# Patient Record
Sex: MALE | Race: WHITE | NOT HISPANIC OR LATINO | Employment: FULL TIME | URBAN - METROPOLITAN AREA
[De-identification: names, ages, dates, MRNs, and addresses within clinical notes are randomized per-mention and may not be internally consistent; named-entity substitution may affect disease eponyms.]

---

## 2023-04-06 DIAGNOSIS — B00.9 HERPES: ICD-10-CM

## 2023-04-06 DIAGNOSIS — E03.9 HYPOTHYROIDISM, UNSPECIFIED TYPE: Primary | ICD-10-CM

## 2023-04-06 RX ORDER — LEVOTHYROXINE SODIUM 50 UG/1
50 TABLET ORAL DAILY
Qty: 90 TABLET | Refills: 1 | Status: SHIPPED | OUTPATIENT
Start: 2023-04-06 | End: 2023-10-06 | Stop reason: SDUPTHER

## 2023-04-06 RX ORDER — VALACYCLOVIR HYDROCHLORIDE 500 MG/1
500 TABLET, FILM COATED ORAL 2 TIMES DAILY
COMMUNITY
Start: 2015-11-18 | End: 2023-04-06 | Stop reason: SDUPTHER

## 2023-04-06 RX ORDER — VALACYCLOVIR HYDROCHLORIDE 500 MG/1
500 TABLET, FILM COATED ORAL 2 TIMES DAILY
Qty: 30 TABLET | Refills: 1 | Status: SHIPPED | OUTPATIENT
Start: 2023-04-06 | End: 2023-11-02 | Stop reason: SDUPTHER

## 2023-04-06 RX ORDER — LEVOTHYROXINE SODIUM 50 UG/1
50 TABLET ORAL DAILY
COMMUNITY
End: 2023-04-06 | Stop reason: SDUPTHER

## 2023-10-04 ENCOUNTER — TELEPHONE (OUTPATIENT)
Dept: PRIMARY CARE | Facility: CLINIC | Age: 53
End: 2023-10-04
Payer: COMMERCIAL

## 2023-10-04 DIAGNOSIS — E03.9 HYPOTHYROIDISM, UNSPECIFIED TYPE: ICD-10-CM

## 2023-10-06 RX ORDER — LEVOTHYROXINE SODIUM 50 UG/1
50 TABLET ORAL DAILY
Qty: 90 TABLET | Refills: 1 | Status: SHIPPED | OUTPATIENT
Start: 2023-10-06 | End: 2023-11-02 | Stop reason: SDUPTHER

## 2023-11-02 ENCOUNTER — OFFICE VISIT (OUTPATIENT)
Dept: PRIMARY CARE | Facility: CLINIC | Age: 53
End: 2023-11-02
Payer: COMMERCIAL

## 2023-11-02 VITALS
SYSTOLIC BLOOD PRESSURE: 107 MMHG | WEIGHT: 178.13 LBS | HEIGHT: 70 IN | OXYGEN SATURATION: 95 % | TEMPERATURE: 97.7 F | BODY MASS INDEX: 25.5 KG/M2 | HEART RATE: 51 BPM | RESPIRATION RATE: 16 BRPM | DIASTOLIC BLOOD PRESSURE: 70 MMHG

## 2023-11-02 DIAGNOSIS — E03.9 HYPOTHYROIDISM, UNSPECIFIED TYPE: ICD-10-CM

## 2023-11-02 DIAGNOSIS — Z12.5 PROSTATE CANCER SCREENING: Primary | ICD-10-CM

## 2023-11-02 DIAGNOSIS — Z11.4 ENCOUNTER FOR SCREENING FOR HIV: ICD-10-CM

## 2023-11-02 DIAGNOSIS — B00.9 HERPES: ICD-10-CM

## 2023-11-02 DIAGNOSIS — Z11.59 ENCOUNTER FOR HEPATITIS C SCREENING TEST FOR LOW RISK PATIENT: ICD-10-CM

## 2023-11-02 DIAGNOSIS — Z00.00 HEALTHCARE MAINTENANCE: ICD-10-CM

## 2023-11-02 PROBLEM — I49.3 ASYMPTOMATIC PVCS: Status: ACTIVE | Noted: 2023-11-02

## 2023-11-02 PROBLEM — K21.9 GERD (GASTROESOPHAGEAL REFLUX DISEASE): Status: ACTIVE | Noted: 2023-11-02

## 2023-11-02 PROBLEM — K22.70 BARRETT'S ESOPHAGUS WITHOUT DYSPLASIA: Status: ACTIVE | Noted: 2023-11-02

## 2023-11-02 PROBLEM — J45.909 ASTHMA (HHS-HCC): Status: ACTIVE | Noted: 2023-11-02

## 2023-11-02 PROBLEM — J12.82 PNEUMONIA DUE TO COVID-19 VIRUS: Status: ACTIVE | Noted: 2023-11-02

## 2023-11-02 PROBLEM — F41.9 ANXIETY: Status: ACTIVE | Noted: 2023-11-02

## 2023-11-02 PROBLEM — U07.1 PNEUMONIA DUE TO COVID-19 VIRUS: Status: ACTIVE | Noted: 2023-11-02

## 2023-11-02 PROBLEM — M54.12 CERVICAL NEURALGIA: Status: ACTIVE | Noted: 2023-11-02

## 2023-11-02 LAB
ALBUMIN SERPL BCP-MCNC: 4.2 G/DL (ref 3.4–5)
ALP SERPL-CCNC: 46 U/L (ref 33–120)
ALT SERPL W P-5'-P-CCNC: 20 U/L (ref 10–52)
ANION GAP SERPL CALC-SCNC: 9 MMOL/L (ref 10–20)
AST SERPL W P-5'-P-CCNC: 25 U/L (ref 9–39)
BILIRUB SERPL-MCNC: 0.4 MG/DL (ref 0–1.2)
BUN SERPL-MCNC: 14 MG/DL (ref 6–23)
CALCIUM SERPL-MCNC: 9.3 MG/DL (ref 8.6–10.3)
CHLORIDE SERPL-SCNC: 100 MMOL/L (ref 98–107)
CHOLEST SERPL-MCNC: 180 MG/DL (ref 0–199)
CHOLESTEROL/HDL RATIO: 2.9
CO2 SERPL-SCNC: 29 MMOL/L (ref 21–32)
CREAT SERPL-MCNC: 0.87 MG/DL (ref 0.5–1.3)
ERYTHROCYTE [DISTWIDTH] IN BLOOD BY AUTOMATED COUNT: 12.7 % (ref 11.5–14.5)
GFR SERPL CREATININE-BSD FRML MDRD: >90 ML/MIN/1.73M*2
GLUCOSE SERPL-MCNC: 88 MG/DL (ref 74–99)
HCT VFR BLD AUTO: 40.2 % (ref 41–52)
HCV AB SER QL: NONREACTIVE
HDLC SERPL-MCNC: 61.6 MG/DL
HGB BLD-MCNC: 13.8 G/DL (ref 13.5–17.5)
MCH RBC QN AUTO: 31.7 PG (ref 26–34)
MCHC RBC AUTO-ENTMCNC: 34.3 G/DL (ref 32–36)
MCV RBC AUTO: 92 FL (ref 80–100)
NON-HDL CHOLESTEROL: 118 MG/DL (ref 0–149)
NRBC BLD-RTO: 0 /100 WBCS (ref 0–0)
PLATELET # BLD AUTO: 225 X10*3/UL (ref 150–450)
POTASSIUM SERPL-SCNC: 3.9 MMOL/L (ref 3.5–5.3)
PROT SERPL-MCNC: 6.6 G/DL (ref 6.4–8.2)
RBC # BLD AUTO: 4.36 X10*6/UL (ref 4.5–5.9)
SODIUM SERPL-SCNC: 134 MMOL/L (ref 136–145)
TSH SERPL-ACNC: 1.69 MIU/L (ref 0.44–3.98)
WBC # BLD AUTO: 6.2 X10*3/UL (ref 4.4–11.3)

## 2023-11-02 PROCEDURE — 83718 ASSAY OF LIPOPROTEIN: CPT

## 2023-11-02 PROCEDURE — 84443 ASSAY THYROID STIM HORMONE: CPT

## 2023-11-02 PROCEDURE — 87389 HIV-1 AG W/HIV-1&-2 AB AG IA: CPT

## 2023-11-02 PROCEDURE — 86803 HEPATITIS C AB TEST: CPT

## 2023-11-02 PROCEDURE — 99396 PREV VISIT EST AGE 40-64: CPT | Performed by: STUDENT IN AN ORGANIZED HEALTH CARE EDUCATION/TRAINING PROGRAM

## 2023-11-02 PROCEDURE — 80053 COMPREHEN METABOLIC PANEL: CPT

## 2023-11-02 PROCEDURE — 99213 OFFICE O/P EST LOW 20 MIN: CPT | Performed by: STUDENT IN AN ORGANIZED HEALTH CARE EDUCATION/TRAINING PROGRAM

## 2023-11-02 PROCEDURE — 85027 COMPLETE CBC AUTOMATED: CPT

## 2023-11-02 PROCEDURE — 36415 COLL VENOUS BLD VENIPUNCTURE: CPT

## 2023-11-02 PROCEDURE — 1036F TOBACCO NON-USER: CPT | Performed by: STUDENT IN AN ORGANIZED HEALTH CARE EDUCATION/TRAINING PROGRAM

## 2023-11-02 PROCEDURE — 82465 ASSAY BLD/SERUM CHOLESTEROL: CPT

## 2023-11-02 RX ORDER — VALACYCLOVIR HYDROCHLORIDE 500 MG/1
500 TABLET, FILM COATED ORAL 2 TIMES DAILY
Qty: 30 TABLET | Refills: 1 | Status: SHIPPED | OUTPATIENT
Start: 2023-11-02

## 2023-11-02 RX ORDER — LEVOTHYROXINE SODIUM 50 UG/1
50 TABLET ORAL DAILY
Qty: 90 TABLET | Refills: 3 | Status: SHIPPED | OUTPATIENT
Start: 2023-11-02

## 2023-11-02 RX ORDER — LEVOTHYROXINE SODIUM 50 UG/1
50 TABLET ORAL DAILY
Qty: 90 TABLET | Refills: 1 | Status: SHIPPED | OUTPATIENT
Start: 2023-11-02 | End: 2023-11-02 | Stop reason: SDUPTHER

## 2023-11-02 SDOH — ECONOMIC STABILITY: FOOD INSECURITY: WITHIN THE PAST 12 MONTHS, THE FOOD YOU BOUGHT JUST DIDN'T LAST AND YOU DIDN'T HAVE MONEY TO GET MORE.: NEVER TRUE

## 2023-11-02 SDOH — ECONOMIC STABILITY: FOOD INSECURITY: WITHIN THE PAST 12 MONTHS, YOU WORRIED THAT YOUR FOOD WOULD RUN OUT BEFORE YOU GOT MONEY TO BUY MORE.: NEVER TRUE

## 2023-11-02 SDOH — ECONOMIC STABILITY: TRANSPORTATION INSECURITY
IN THE PAST 12 MONTHS, HAS LACK OF TRANSPORTATION KEPT YOU FROM MEETINGS, WORK, OR FROM GETTING THINGS NEEDED FOR DAILY LIVING?: NO

## 2023-11-02 SDOH — ECONOMIC STABILITY: GENERAL
WHICH OF THE FOLLOWING WOULD YOU LIKE TO GET CONNECTED TO IN ORDER TO RECEIVE A DISCOUNT OR FOR FREE? (CHOOSE ALL THAT APPLY): NONE OF THESE

## 2023-11-02 SDOH — HEALTH STABILITY: PHYSICAL HEALTH: ON AVERAGE, HOW MANY MINUTES DO YOU ENGAGE IN EXERCISE AT THIS LEVEL?: 0 MIN

## 2023-11-02 SDOH — ECONOMIC STABILITY: HOUSING INSECURITY
IN THE LAST 12 MONTHS, WAS THERE A TIME WHEN YOU DID NOT HAVE A STEADY PLACE TO SLEEP OR SLEPT IN A SHELTER (INCLUDING NOW)?: NO

## 2023-11-02 SDOH — HEALTH STABILITY: PHYSICAL HEALTH: ON AVERAGE, HOW MANY DAYS PER WEEK DO YOU ENGAGE IN MODERATE TO STRENUOUS EXERCISE (LIKE A BRISK WALK)?: 0 DAYS

## 2023-11-02 SDOH — ECONOMIC STABILITY: INCOME INSECURITY: IN THE LAST 12 MONTHS, WAS THERE A TIME WHEN YOU WERE NOT ABLE TO PAY THE MORTGAGE OR RENT ON TIME?: NO

## 2023-11-02 SDOH — ECONOMIC STABILITY: TRANSPORTATION INSECURITY
IN THE PAST 12 MONTHS, HAS THE LACK OF TRANSPORTATION KEPT YOU FROM MEDICAL APPOINTMENTS OR FROM GETTING MEDICATIONS?: NO

## 2023-11-02 SDOH — ECONOMIC STABILITY: GENERAL
WHICH OF THE FOLLOWING DO YOU KNOW HOW TO USE AND HAVE ACCESS TO EVERY DAY? (CHOOSE ALL THAT APPLY): DESKTOP COMPUTER, LAPTOP COMPUTER, OR TABLET WITH BROADBAND INTERNET CONNECTION;SMARTPHONE WITH CELLULAR DATA PLAN

## 2023-11-02 ASSESSMENT — PATIENT HEALTH QUESTIONNAIRE - PHQ9
1. LITTLE INTEREST OR PLEASURE IN DOING THINGS: NOT AT ALL
2. FEELING DOWN, DEPRESSED OR HOPELESS: NOT AT ALL
SUM OF ALL RESPONSES TO PHQ9 QUESTIONS 1 & 2: 0

## 2023-11-02 ASSESSMENT — ENCOUNTER SYMPTOMS
LOSS OF SENSATION IN FEET: 0
OCCASIONAL FEELINGS OF UNSTEADINESS: 0
DEPRESSION: 0

## 2023-11-02 ASSESSMENT — SOCIAL DETERMINANTS OF HEALTH (SDOH)
IN A TYPICAL WEEK, HOW MANY TIMES DO YOU TALK ON THE PHONE WITH FAMILY, FRIENDS, OR NEIGHBORS?: ONCE A WEEK
HOW HARD IS IT FOR YOU TO PAY FOR THE VERY BASICS LIKE FOOD, HOUSING, MEDICAL CARE, AND HEATING?: NOT HARD AT ALL
HOW OFTEN DO YOU ATTEND CHURCH OR RELIGIOUS SERVICES?: NEVER
WITHIN THE LAST YEAR, HAVE YOU BEEN KICKED, HIT, SLAPPED, OR OTHERWISE PHYSICALLY HURT BY YOUR PARTNER OR EX-PARTNER?: NO
DO YOU BELONG TO ANY CLUBS OR ORGANIZATIONS SUCH AS CHURCH GROUPS UNIONS, FRATERNAL OR ATHLETIC GROUPS, OR SCHOOL GROUPS?: NO
WITHIN THE LAST YEAR, HAVE YOU BEEN HUMILIATED OR EMOTIONALLY ABUSED IN OTHER WAYS BY YOUR PARTNER OR EX-PARTNER?: NO
HOW OFTEN DO YOU GET TOGETHER WITH FRIENDS OR RELATIVES?: ONCE A WEEK
HOW OFTEN DO YOU ATTENT MEETINGS OF THE CLUB OR ORGANIZATION YOU BELONG TO?: NEVER
WITHIN THE LAST YEAR, HAVE TO BEEN RAPED OR FORCED TO HAVE ANY KIND OF SEXUAL ACTIVITY BY YOUR PARTNER OR EX-PARTNER?: NO
WITHIN THE LAST YEAR, HAVE YOU BEEN AFRAID OF YOUR PARTNER OR EX-PARTNER?: NO
IN THE PAST 12 MONTHS, HAS THE ELECTRIC, GAS, OIL, OR WATER COMPANY THREATENED TO SHUT OFF SERVICE IN YOUR HOME?: NO

## 2023-11-02 ASSESSMENT — LIFESTYLE VARIABLES
HOW OFTEN DO YOU HAVE A DRINK CONTAINING ALCOHOL: 2-4 TIMES A MONTH
HOW MANY STANDARD DRINKS CONTAINING ALCOHOL DO YOU HAVE ON A TYPICAL DAY: 3 OR 4

## 2023-11-02 NOTE — PROGRESS NOTES
Subjective   Tano Can is a 53 y.o. male who is here for a routine exam.  Active Problem List      Comprehensive Medical/Surgical/Social/Family History  History reviewed. No pertinent past medical history.  Past Surgical History:   Procedure Laterality Date    HERNIA REPAIR  02/25/2016    Hernia Repair    MOUTH SURGERY  02/25/2016    Oral Surgery Tooth Extraction    OTHER SURGICAL HISTORY  12/13/2021    Colonoscopy    OTHER SURGICAL HISTORY  12/13/2021    Esophagogastroduodenoscopy     Social History     Social History Narrative    Not on file         Allergies and Medications  Patient has no known allergies.  Current Outpatient Medications on File Prior to Visit   Medication Sig Dispense Refill    [DISCONTINUED] levothyroxine (Synthroid, Levoxyl) 50 mcg tablet Take 1 tablet (50 mcg) by mouth once daily. 90 tablet 1    [DISCONTINUED] valACYclovir (Valtrex) 500 mg tablet Take 1 tablet (500 mg) by mouth in the morning and 1 tablet (500 mg) before bedtime. (Patient not taking: Reported on 11/2/2023) 30 tablet 1     No current facility-administered medications on file prior to visit.       Concerns today:  None today, patient needs refill of valacyclovir which she only uses intermittently and Synthroid.  No new symptoms, no new concerns today.    Lifestyle  Diet:  Healthy, Well Balanced, and Home-cooked  Physical Activity: Inactive (11/2/2023)    Exercise Vital Sign     Days of Exercise per Week: 0 days     Minutes of Exercise per Session: 0 min      Tobacco Use: Low Risk  (11/2/2023)    Patient History     Smoking Tobacco Use: Never     Smokeless Tobacco Use: Never     Passive Exposure: Not on file      Alcohol Use: Unknown (11/2/2023)    AUDIT-C     Frequency of Alcohol Consumption: 2-4 times a month     Average Number of Drinks: 3 or 4     Frequency of Binge Drinking: Not on file      Depression: Not at risk (11/2/2023)    PHQ-2     PHQ-2 Score: 0      Stress: No Stress Concern Present (11/2/2023)     "[employer] - Occupational Stress Questionnaire     Feeling of Stress : Not at all     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 Leon EGD  Allergy       Colonoscopy: 2020  EGD - 2023    Review of Systems   Constitutional:  Negative for appetite change, fatigue and fever.   HENT:  Negative for ear discharge, ear pain, hearing loss, postnasal drip, rhinorrhea and sinus pain.    Eyes:  Negative for visual disturbance.   Respiratory:  Negative for shortness of breath.    Cardiovascular:  Negative for chest pain, palpitations and leg swelling.   Gastrointestinal:  Negative for abdominal pain, blood in stool, constipation, diarrhea, nausea and vomiting.   Genitourinary:  Negative for flank pain and hematuria.   Musculoskeletal:  Negative for arthralgias and myalgias.   Skin:  Negative for rash.   Neurological:  Negative for dizziness and light-headedness.   All other systems reviewed and are negative.      Objective   /70 (BP Location: Right arm, Patient Position: Sitting)   Pulse 51   Temp 36.5 °C (97.7 °F) (Temporal)   Resp 16   Ht 1.778 m (5' 10\")   Wt 80.8 kg (178 lb 2 oz)   SpO2 95%   BMI 25.56 kg/m²     Physical Exam  Vitals reviewed.   Constitutional:       General: He is not in acute distress.     Appearance: Normal appearance. He is normal weight. He is not toxic-appearing.   HENT:      Head: Normocephalic and atraumatic.      Right Ear: Tympanic membrane and ear canal normal.      Left Ear: Tympanic membrane and ear canal normal.      Nose: Nose normal. No congestion or rhinorrhea.      Mouth/Throat:      Mouth: Mucous membranes are dry.   Eyes:      General: No scleral icterus.     Extraocular Movements: Extraocular movements intact.      Conjunctiva/sclera: Conjunctivae normal.      Pupils: Pupils are equal, round, and reactive to light.   Cardiovascular:      Rate and Rhythm: Normal rate and regular rhythm.      Heart " sounds: No murmur heard.     No friction rub. No gallop.   Pulmonary:      Effort: Pulmonary effort is normal. No respiratory distress.      Breath sounds: Normal breath sounds. No wheezing, rhonchi or rales.   Abdominal:      General: Abdomen is flat. There is no distension.      Palpations: Abdomen is soft.      Tenderness: There is no abdominal tenderness. There is no guarding.   Musculoskeletal:         General: Normal range of motion.      Cervical back: Normal range of motion and neck supple.      Right lower leg: No edema.      Left lower leg: No edema.   Lymphadenopathy:      Cervical: No cervical adenopathy.   Skin:     General: Skin is warm and dry.   Neurological:      General: No focal deficit present.      Mental Status: He is alert and oriented to person, place, and time.   Psychiatric:         Mood and Affect: Mood normal.         Behavior: Behavior normal.         Assessment/Plan   Problem List Items Addressed This Visit       Herpes    Relevant Medications    valACYclovir (Valtrex) 500 mg tablet    Hypothyroidism    Relevant Orders    TSH with reflex to Free T4 if abnormal (Completed)     Other Visit Diagnoses       Prostate cancer screening    -  Primary    Healthcare maintenance        Relevant Orders    Comprehensive Metabolic Panel (Completed)    CBC (Completed)    Lipid Panel Non-Fasting (Completed)    Encounter for screening for HIV        Relevant Orders    HIV 1/2 Antigen/Antibody Screen with Reflex to Confirmation    Encounter for hepatitis C screening test for low risk patient        Relevant Orders    Hepatitis C Antibody (Completed)            Reviewed Social Determinants of health with patient, discussed healthy lifestyle including 150 minutes of physical activity per week  Ordered/Reviewed baseline labwork -CBC, CMP, Lipid Panel  Immunizations Up-to-Date  Follow Up:  as needed    We will check TSH, refill Synthroid as able.    Follow-up as new concerns arise.

## 2023-11-03 LAB — HIV 1+2 AB+HIV1 P24 AG SERPL QL IA: NONREACTIVE

## 2023-11-03 ASSESSMENT — ENCOUNTER SYMPTOMS
HEMATURIA: 0
ARTHRALGIAS: 0
MYALGIAS: 0
APPETITE CHANGE: 0
FLANK PAIN: 0
ABDOMINAL PAIN: 0
BLOOD IN STOOL: 0
FEVER: 0
SHORTNESS OF BREATH: 0
SINUS PAIN: 0
LIGHT-HEADEDNESS: 0
RHINORRHEA: 0
CONSTIPATION: 0
NAUSEA: 0
DIZZINESS: 0
DIARRHEA: 0
PALPITATIONS: 0
FATIGUE: 0
VOMITING: 0

## 2023-12-18 ENCOUNTER — OFFICE VISIT (OUTPATIENT)
Dept: GASTROENTEROLOGY | Facility: CLINIC | Age: 53
End: 2023-12-18
Payer: COMMERCIAL

## 2023-12-18 VITALS
HEIGHT: 70 IN | TEMPERATURE: 99.1 F | HEART RATE: 66 BPM | WEIGHT: 175 LBS | SYSTOLIC BLOOD PRESSURE: 107 MMHG | DIASTOLIC BLOOD PRESSURE: 68 MMHG | BODY MASS INDEX: 25.05 KG/M2

## 2023-12-18 DIAGNOSIS — K22.70 BARRETT'S ESOPHAGUS WITHOUT DYSPLASIA: Primary | ICD-10-CM

## 2023-12-18 PROCEDURE — 99213 OFFICE O/P EST LOW 20 MIN: CPT | Performed by: INTERNAL MEDICINE

## 2023-12-18 PROCEDURE — 1036F TOBACCO NON-USER: CPT | Performed by: INTERNAL MEDICINE

## 2023-12-18 RX ORDER — MINERAL OIL
180 ENEMA (ML) RECTAL DAILY
COMMUNITY

## 2023-12-18 RX ORDER — CETIRIZINE HYDROCHLORIDE 10 MG/1
10 TABLET ORAL DAILY
COMMUNITY

## 2023-12-18 RX ORDER — MONTELUKAST SODIUM 4 MG/1
4 TABLET, CHEWABLE ORAL NIGHTLY
COMMUNITY

## 2023-12-18 RX ORDER — OMEPRAZOLE 20 MG/1
20 TABLET, DELAYED RELEASE ORAL DAILY
Qty: 30 TABLET | Refills: 11 | Status: SHIPPED | OUTPATIENT
Start: 2023-12-18 | End: 2024-03-01 | Stop reason: SDUPTHER

## 2023-12-18 RX ORDER — ERGOCALCIFEROL 1.25 MG/1
1.25 CAPSULE ORAL
COMMUNITY

## 2023-12-18 RX ORDER — OLOPATADINE HYDROCHLORIDE AND MOMETASONE FUROATE 25; 665 UG/1; UG/1
SPRAY, METERED NASAL 2 TIMES DAILY
COMMUNITY

## 2023-12-18 RX ORDER — TAMSULOSIN HYDROCHLORIDE 0.4 MG/1
0.4 CAPSULE ORAL DAILY
COMMUNITY

## 2023-12-18 RX ORDER — OMEPRAZOLE 40 MG/1
40 CAPSULE, DELAYED RELEASE ORAL
COMMUNITY

## 2023-12-18 NOTE — PROGRESS NOTES
"History Of Present Illness  Tano Can is a 53 y.o. male presenting with Barretts esophagus.       This is a 53-year-old man with long segment Hitchcock's esophagus.  His last EGD was in 2020 and at that time had 5 cm segment of nondysplastic Hitchcock's epithelium.  He also had a hiatal hernia that measured about 4 cm.  He has been on chronic PPI therapy currently takes omeprazole 40 mg daily.  On that occasion he has had no GERD symptoms.  He is interested in potentially discontinuing or at least reducing the dose of the medication as he is concerned about possible long-term side effects.  He is scheduled for repeat EGD next week.  Past Medical History  No past medical history on file.    Surgical History  Past Surgical History:   Procedure Laterality Date    HERNIA REPAIR  02/25/2016    Hernia Repair    MOUTH SURGERY  02/25/2016    Oral Surgery Tooth Extraction    OTHER SURGICAL HISTORY  12/13/2021    Colonoscopy    OTHER SURGICAL HISTORY  12/13/2021    Esophagogastroduodenoscopy        Social History  He reports that he has never smoked. He has never used smokeless tobacco. He reports current alcohol use. He reports that he does not use drugs.    Family History  No family history on file.     Allergies  Hay fever and allergy relief    Last Recorded Vitals  /68 (BP Location: Right arm, Patient Position: Sitting, BP Cuff Size: Adult)   Pulse 66   Temp 37.3 °C (99.1 °F)   Ht 1.778 m (5' 10\")   Wt 79.4 kg (175 lb)   BMI 25.11 kg/m²        Physical Exam  Constitutional:       Appearance: Normal appearance.   Cardiovascular:      Rate and Rhythm: Normal rate and regular rhythm.   Pulmonary:      Effort: Pulmonary effort is normal.      Breath sounds: Normal breath sounds.   Abdominal:      General: Bowel sounds are normal.      Palpations: Abdomen is soft. There is no mass.      Tenderness: There is no abdominal tenderness.   Neurological:      Mental Status: He is alert.           Labs  Lab Results "   Component Value Date    GLUCOSE 88 11/02/2023    CALCIUM 9.3 11/02/2023     (L) 11/02/2023    K 3.9 11/02/2023    CO2 29 11/02/2023     11/02/2023    BUN 14 11/02/2023    CREATININE 0.87 11/02/2023     Lab Results   Component Value Date    WBC 6.2 11/02/2023    HGB 13.8 11/02/2023    HCT 40.2 (L) 11/02/2023    MCV 92 11/02/2023     11/02/2023     (L) 11/02/2023    K 3.9 11/02/2023     11/02/2023    CO2 29 11/02/2023    BUN 14 11/02/2023    CREATININE 0.87 11/02/2023    CALCIUM 9.3 11/02/2023    PROT 6.6 11/02/2023    BILITOT 0.4 11/02/2023    ALKPHOS 46 11/02/2023    ALT 20 11/02/2023    AST 25 11/02/2023    GLUCOSE 88 11/02/2023    CRP 3.75 (A) 12/18/2019           Imaging          ASSESSMENT & PLAN  Problem List Items Addressed This Visit             ICD-10-CM    Hitchcock's esophagus without dysplasia - Primary K22.70    Relevant Medications    omeprazole OTC (PriLOSEC OTC) 20 mg EC tablet       He is currently asymptomatic with regards to GERD and we discussed reducing his omeprazole to 20 mg daily.  I would recommend that he stay on long-term PPI therapy however for chemoprevention of Hitchcock's progression.  He will have his EGD next week and we can make further recommendations based on those findings.  I spent 15 minutes in the professional and overall care of this patient.      Luca Leon MD

## 2023-12-18 NOTE — PATIENT INSTRUCTIONS
We will see you next week for the endoscopy to evaluate the Barretts' Although the risk of complications is low with omeprazole we could try reducing to 20 mg a day as any risk would be dose related. Follow up in 6 months.

## 2023-12-27 ENCOUNTER — HOSPITAL ENCOUNTER (OUTPATIENT)
Dept: GASTROENTEROLOGY | Facility: HOSPITAL | Age: 53
Discharge: HOME | End: 2023-12-27
Payer: COMMERCIAL

## 2023-12-27 VITALS
OXYGEN SATURATION: 97 % | BODY MASS INDEX: 24.93 KG/M2 | HEART RATE: 57 BPM | WEIGHT: 174.16 LBS | DIASTOLIC BLOOD PRESSURE: 62 MMHG | SYSTOLIC BLOOD PRESSURE: 107 MMHG | HEIGHT: 70 IN | TEMPERATURE: 97.3 F | RESPIRATION RATE: 14 BRPM

## 2023-12-27 DIAGNOSIS — K21.9 GASTRO-ESOPHAGEAL REFLUX DISEASE WITHOUT ESOPHAGITIS: Primary | ICD-10-CM

## 2023-12-27 DIAGNOSIS — K22.70 BARRETT'S ESOPHAGUS WITHOUT DYSPLASIA: ICD-10-CM

## 2023-12-27 PROCEDURE — 7100000009 HC PHASE TWO TIME - INITIAL BASE CHARGE

## 2023-12-27 PROCEDURE — 0753T DGTZ GLS MCRSCP SLD LEVEL IV: CPT | Mod: TC,SUR,AHULAB | Performed by: INTERNAL MEDICINE

## 2023-12-27 PROCEDURE — 99153 MOD SED SAME PHYS/QHP EA: CPT | Performed by: INTERNAL MEDICINE

## 2023-12-27 PROCEDURE — 88305 TISSUE EXAM BY PATHOLOGIST: CPT | Performed by: PATHOLOGY

## 2023-12-27 PROCEDURE — 2500000004 HC RX 250 GENERAL PHARMACY W/ HCPCS (ALT 636 FOR OP/ED): Performed by: INTERNAL MEDICINE

## 2023-12-27 PROCEDURE — 99152 MOD SED SAME PHYS/QHP 5/>YRS: CPT | Performed by: INTERNAL MEDICINE

## 2023-12-27 PROCEDURE — 3700000013 HC SEDATION LEVEL 5+ TIME - EACH ADDITIONAL 15 MINUTES

## 2023-12-27 PROCEDURE — 7100000010 HC PHASE TWO TIME - EACH INCREMENTAL 1 MINUTE

## 2023-12-27 PROCEDURE — 94760 N-INVAS EAR/PLS OXIMETRY 1: CPT

## 2023-12-27 PROCEDURE — 43239 EGD BIOPSY SINGLE/MULTIPLE: CPT | Performed by: INTERNAL MEDICINE

## 2023-12-27 PROCEDURE — 99152 MOD SED SAME PHYS/QHP 5/>YRS: CPT | Mod: 59

## 2023-12-27 PROCEDURE — 3700000012 HC SEDATION LEVEL 5+ TIME - INITIAL 15 MINUTES 5/> YEARS

## 2023-12-27 PROCEDURE — 99153 MOD SED SAME PHYS/QHP EA: CPT

## 2023-12-27 RX ORDER — MIDAZOLAM HYDROCHLORIDE 1 MG/ML
INJECTION INTRAMUSCULAR; INTRAVENOUS AS NEEDED
Status: COMPLETED | OUTPATIENT
Start: 2023-12-27 | End: 2023-12-27

## 2023-12-27 RX ORDER — SODIUM CHLORIDE, SODIUM LACTATE, POTASSIUM CHLORIDE, CALCIUM CHLORIDE 600; 310; 30; 20 MG/100ML; MG/100ML; MG/100ML; MG/100ML
20 INJECTION, SOLUTION INTRAVENOUS CONTINUOUS
Status: DISCONTINUED | OUTPATIENT
Start: 2023-12-27 | End: 2023-12-28 | Stop reason: HOSPADM

## 2023-12-27 RX ADMIN — MIDAZOLAM HYDROCHLORIDE 2 MG: 1 INJECTION INTRAMUSCULAR; INTRAVENOUS at 07:58

## 2023-12-27 RX ADMIN — MEPERIDINE HYDROCHLORIDE 50 MG: 25 INJECTION, SOLUTION INTRAMUSCULAR; INTRAVENOUS; SUBCUTANEOUS at 07:58

## 2023-12-27 RX ADMIN — MEPERIDINE HYDROCHLORIDE 25 MG: 25 INJECTION, SOLUTION INTRAMUSCULAR; INTRAVENOUS; SUBCUTANEOUS at 08:01

## 2023-12-27 RX ADMIN — MIDAZOLAM HYDROCHLORIDE 1 MG: 1 INJECTION INTRAMUSCULAR; INTRAVENOUS at 08:01

## 2023-12-27 ASSESSMENT — PAIN - FUNCTIONAL ASSESSMENT
PAIN_FUNCTIONAL_ASSESSMENT: 0-10

## 2023-12-27 ASSESSMENT — COLUMBIA-SUICIDE SEVERITY RATING SCALE - C-SSRS
2. HAVE YOU ACTUALLY HAD ANY THOUGHTS OF KILLING YOURSELF?: NO
6. HAVE YOU EVER DONE ANYTHING, STARTED TO DO ANYTHING, OR PREPARED TO DO ANYTHING TO END YOUR LIFE?: NO
1. IN THE PAST MONTH, HAVE YOU WISHED YOU WERE DEAD OR WISHED YOU COULD GO TO SLEEP AND NOT WAKE UP?: NO

## 2023-12-27 ASSESSMENT — PAIN SCALES - GENERAL
PAINLEVEL_OUTOF10: 0 - NO PAIN

## 2023-12-27 NOTE — PERIOPERATIVE NURSING NOTE
826: Phase 2 care begins  900: Discharge instructions reviewed with pt and cousin  908: Dr Leon bedside to speak to pt  909: IV removed  918: Pt transported to [facility]

## 2023-12-27 NOTE — DISCHARGE INSTRUCTIONS
Patient Instructions after an Endoscopy      The anesthetics, sedatives or narcotics which were given to you today will be acting in your body for the next 24 hours, so you might feel a little sleepy or groggy.  This feeling should slowly wear off. Carefully read and follow the instructions.     You received sedation today:  - Do not drive or operate any machinery or power tools of any kind.   - No alcoholic beverages today, not even beer or wine.  - Do not make any important decisions or sign any legal documents.  - No over the counter medications that contain alcohol or that may cause drowsiness.  - Do not make any important decisions or sign any legal documents.    While it is common to experience mild to moderate abdominal distention, gas, or belching after your procedure, if any of these symptoms occur following discharge from the GI Lab or within one week of having your procedure, call the [facility] to be advised whether a visit to your nearest Urgent Care or Emergency Department is indicated.  Take this paper with you if you go.     - If you develop an allergic reaction to the medications that were given during your procedure such as difficulty breathing, rash, hives, severe nausea, vomiting or lightheadedness.  - If you experience chest pain, shortness of breath, severe abdominal pain, fevers and chills.  -If you develop signs and symptoms of bleeding such as blood in your spit, if your stools turn black, tarry, or bloody  - If you have not urinated within 8 hours following your procedure.  - If your IV site becomes painful, red, inflamed, or looks infected.    If you received a biopsy/polypectomy/sphincterotomy the following instructions apply below:    __ Do not use Aspirin containing products, non-steroidal medications or anti-coagulants for one week following your procedure. (Examples of these types of medications are: Advil, Arthrotec, Aleve, Coumadin, Ecotrin, Heparin, Ibuprofen,  Indocin, Motrin, Naprosyn, Nuprin, Plavix, Vioxx, and Voltarin, or their generic forms.  This list is not all-inclusive.  Check with your physician or pharmacist before resuming medications.)   __ Eat a soft diet today.  Avoid foods that are poorly digested for the next 24 hours.  These foods would include: nuts, beans, lettuce, red meats, and fried foods. Start with liquids and advance your diet as tolerated, gradually work up to eating solids.   __ Do not have a Barium Study or Enema for one week.    Your physician recommends the additional following instructions:    -You have a contact number available for emergencies. The signs and symptoms of potential delayed complications were discussed with you. You may return to normal activities tomorrow.  -Resume your previous diet.  -Continue your present medications.   -We are waiting for your pathology results.  -Your physician has recommended a repeat colonoscopy (date to be determined after pending pathology results are reviewed) for surveillance based on pathology results.  -The findings and recommendations have been discussed with you.  -The findings and recommendations were discussed with your family.  - Please see Medication Reconciliation Form for new medication/medications prescribed.       If you experience any problems or have any questions following discharge from the GI Lab, please call:    Luca Leon MD  [phone]      Nurse Signature                                                                        Date___________________                                                                            Patient/Responsible Party Signature                                        Date___________________

## 2023-12-27 NOTE — PRE-SEDATION DOCUMENTATION
Patient: Tano Can  MRN: [id number]    Pre-sedation Evaluation:  Sedation necessary for: Analgesia  Requesting service: GI    History of Present Illness: Barretts     Past Medical History:   Diagnosis Date    GERD (gastroesophageal reflux disease)     Hypothyroid     Seasonal allergies        Principle problems:  Patient Active Problem List    Diagnosis Date Noted    Anxiety 11/02/2023    Asthma 11/02/2023    Asymptomatic PVCs 11/02/2023    Hitchcock's esophagus without dysplasia 11/02/2023    GERD (gastroesophageal reflux disease) 11/02/2023    Herpes 11/02/2023    Hypothyroidism 11/02/2023    Cervical neuralgia 11/02/2023    Pneumonia due to COVID-19 virus 11/02/2023     Allergies:  No Known Allergies  PTA/Current Medications:  (Not in a hospital admission)    Current Outpatient Medications   Medication Sig Dispense Refill    cetirizine (ZyrTEC) 10 mg tablet Take 1 tablet (10 mg) by mouth once daily.      ergocalciferol (Vitamin D-2) 1.25 MG (24419 UT) capsule Take 1 capsule (1,250 mcg) by mouth 1 (one) time per week.      fexofenadine (Allegra) 180 mg tablet Take 1 tablet (180 mg) by mouth once daily.      levothyroxine (Synthroid, Levoxyl) 50 mcg tablet Take 1 tablet (50 mcg) by mouth once daily. 90 tablet 3    montelukast (Singulair) 4 mg chewable tablet Chew 1 tablet (4 mg) once daily at bedtime.      olopatadine-mometasone (Ryaltris) 665-25 mcg/spray spray,non-aerosol Administer into affected nostril(s) 2 times a day.      omeprazole (PriLOSEC) 40 mg DR capsule Take 1 capsule (40 mg) by mouth. Do not crush or chew.      tamsulosin (Flomax) 0.4 mg 24 hr capsule Take 1 capsule (0.4 mg) by mouth once daily.      omeprazole OTC (PriLOSEC OTC) 20 mg EC tablet Take 1 tablet (20 mg) by mouth once daily. Do not crush, chew, or split. (Patient not taking: Reported on 12/27/2023) 30 tablet 11    valACYclovir (Valtrex) 500 mg tablet Take 1 tablet (500 mg) by mouth 2 times a day. (Patient not taking: Reported on  12/27/2023) 30 tablet 1     No current facility-administered medications for this encounter.     Past Surgical History:   has a past surgical history that includes Hernia repair (02/25/2016); Mouth surgery (02/25/2016); Other surgical history (12/13/2021); and Other surgical history (12/13/2021).    Recent sedation/surgery (24 hours) No    Review of Systems:  Please check all that apply: No significant medical history        NPO guidelines met: Yes    Physical Exam    Airway  Mallampati: II     Cardiovascular - normal exam     Dental    Pulmonary - normal exam         Plan    ASA 2     Moderate

## 2023-12-28 NOTE — ADDENDUM NOTE
Encounter addended by: Junior Davalos RN on: 12/28/2023 7:44 AM   Actions taken: Contacts section saved, Flowsheet accepted

## 2024-01-03 LAB
LABORATORY COMMENT REPORT: NORMAL
PATH REPORT.COMMENTS IMP SPEC: NORMAL
PATH REPORT.FINAL DX SPEC: NORMAL
PATH REPORT.GROSS SPEC: NORMAL
PATH REPORT.TOTAL CANCER: NORMAL

## 2024-03-01 ENCOUNTER — OFFICE VISIT (OUTPATIENT)
Dept: PRIMARY CARE | Facility: CLINIC | Age: 54
End: 2024-03-01
Payer: COMMERCIAL

## 2024-03-01 VITALS
HEIGHT: 70 IN | WEIGHT: 175 LBS | RESPIRATION RATE: 18 BRPM | BODY MASS INDEX: 25.05 KG/M2 | SYSTOLIC BLOOD PRESSURE: 98 MMHG | DIASTOLIC BLOOD PRESSURE: 64 MMHG | OXYGEN SATURATION: 98 % | TEMPERATURE: 99.2 F | HEART RATE: 83 BPM

## 2024-03-01 DIAGNOSIS — J02.9 SORE THROAT: Primary | ICD-10-CM

## 2024-03-01 DIAGNOSIS — J02.9 VIRAL PHARYNGITIS: ICD-10-CM

## 2024-03-01 PROCEDURE — 99213 OFFICE O/P EST LOW 20 MIN: CPT

## 2024-03-01 PROCEDURE — 1036F TOBACCO NON-USER: CPT

## 2024-03-01 PROCEDURE — 87636 SARSCOV2 & INF A&B AMP PRB: CPT

## 2024-03-01 ASSESSMENT — ENCOUNTER SYMPTOMS
MYALGIAS: 0
SORE THROAT: 1
COUGH: 1
ABDOMINAL PAIN: 0
ACTIVITY CHANGE: 0
FEVER: 0
CHILLS: 0
APPETITE CHANGE: 0
HEADACHES: 0
NAUSEA: 0
FATIGUE: 1
WHEEZING: 0
SHORTNESS OF BREATH: 0
RHINORRHEA: 0
DIARRHEA: 0

## 2024-03-01 NOTE — PROGRESS NOTES
"Subjective   Patient ID: [id number]   Tano Can is a 53 y.o. male who presents for Sore Throat (Sore throat, fatigue and slight cough since Wednesday./Denies fever, NVD).  Patient presents with 3 days of sore throat.  He describes a cough that is similar to baseline from allergies with possibly slightly increased phlegm.  He also reports associated fatigue but denies fever, otalgia, itchy/watery eyes, nausea, vomiting, diarrhea, dyspnea, or chest pain.  He has an extensive allergy regimen which includes Allegra, Singulair, allergy shots, and Ryaltris nasal spray which he has continued without interruption.  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  He states this does not feel like his typical allergies.  He has no sick contacts.      Review of Systems   Constitutional:  Positive for fatigue. Negative for activity change, appetite change, chills and fever.   HENT:  Positive for congestion and sore throat. Negative for rhinorrhea.    Respiratory:  Positive for cough. Negative for shortness of breath and wheezing.    Cardiovascular:  Negative for chest pain.   Gastrointestinal:  Negative for abdominal pain, diarrhea and nausea.   Musculoskeletal:  Negative for myalgias.   Skin:  Negative for rash.   Neurological:  Negative for headaches.       Objective   BP 98/64 (BP Location: Right arm, Patient Position: Sitting)   Pulse 83   Temp 37.3 °C (99.2 °F)   Resp 18   Ht 1.778 m (5' 10\")   Wt 79.4 kg (175 lb)   SpO2 98%   BMI 25.11 kg/m²    Physical Exam  Vitals and nursing note reviewed.   Constitutional:       General: He is not in acute distress.     Appearance: Normal appearance. He is not toxic-appearing.   HENT:      Head: Normocephalic and atraumatic.      Right Ear: Ear canal and external ear normal. There is impacted cerumen.      Left Ear: Ear canal and external ear normal. There is " impacted cerumen.      Nose: Congestion present.      Mouth/Throat:      Mouth: Mucous membranes are moist.      Pharynx: Oropharynx is clear. Uvula midline. No oropharyngeal exudate or posterior oropharyngeal erythema.      Tonsils: No tonsillar exudate or tonsillar abscesses.   Eyes:      General: No scleral icterus.     Extraocular Movements: Extraocular movements intact.      Pupils: Pupils are equal, round, and reactive to light.   Cardiovascular:      Rate and Rhythm: Normal rate and regular rhythm.      Pulses: Normal pulses.      Heart sounds: Normal heart sounds. No murmur heard.     No friction rub. No gallop.   Pulmonary:      Effort: Pulmonary effort is normal. No respiratory distress.      Breath sounds: Normal breath sounds. No stridor. No wheezing, rhonchi or rales.   Abdominal:      General: Abdomen is flat. Bowel sounds are normal. There is no distension.      Palpations: Abdomen is soft. There is no mass.      Tenderness: There is no abdominal tenderness. There is no guarding or rebound.      Hernia: No hernia is present.   Musculoskeletal:         General: No swelling, deformity or signs of injury. Normal range of motion.      Cervical back: Normal range of motion and neck supple. No rigidity.      Right lower leg: No edema.      Left lower leg: No edema.   Lymphadenopathy:      Cervical: No cervical adenopathy.   Skin:     General: Skin is warm and dry.      Capillary Refill: Capillary refill takes less than 2 seconds.      Findings: No rash.   Neurological:      General: No focal deficit present.      Mental Status: He is alert and oriented to person, place, and time. Mental status is at baseline.   Psychiatric:         Mood and Affect: Mood normal.         Behavior: Behavior normal.         Assessment/Plan   Problem List Items Addressed This Visit    None  Visit Diagnoses       Sore throat    -  Primary    Relevant Orders    Sars-CoV-2 and Influenza A/B PCR    Referral to Allergy    Viral  pharyngitis              Suspect symptoms are secondary to viral pharyngitis versus allergic rhinitis.  Centor criteria is -1, physical exam not consistent with strep pharyngitis.  Will check flu and COVID. Allergic symptoms are well treated.  Patient does state his current allergist is retiring, requests referral to  allergy.    Discussed with attending physician Dr. Urrutia.      Angel Yo, DO

## 2024-03-01 NOTE — PROGRESS NOTES
I reviewed with the resident the medical history and the resident’s findings on physical examination.  I discussed with the resident the patient’s diagnosis and concur with the treatment plan as documented in the resident note.     Abhi Urrutia MD

## 2024-03-01 NOTE — PATIENT INSTRUCTIONS
Prabhakar,   Good to see you today.     [facility]     Continue over the counter treatments, use tylenol/ibuprofen for the sore throat.

## 2024-03-02 LAB
FLUAV RNA RESP QL NAA+PROBE: NOT DETECTED
FLUBV RNA RESP QL NAA+PROBE: NOT DETECTED
SARS-COV-2 RNA RESP QL NAA+PROBE: NOT DETECTED

## 2024-06-17 ENCOUNTER — APPOINTMENT (OUTPATIENT)
Dept: GASTROENTEROLOGY | Facility: CLINIC | Age: 54
End: 2024-06-17
Payer: COMMERCIAL

## 2024-09-09 ENCOUNTER — OFFICE VISIT (OUTPATIENT)
Dept: GASTROENTEROLOGY | Facility: CLINIC | Age: 54
End: 2024-09-09
Payer: COMMERCIAL

## 2024-09-09 VITALS
SYSTOLIC BLOOD PRESSURE: 107 MMHG | DIASTOLIC BLOOD PRESSURE: 74 MMHG | WEIGHT: 170 LBS | HEIGHT: 70 IN | TEMPERATURE: 99 F | HEART RATE: 64 BPM | BODY MASS INDEX: 24.34 KG/M2

## 2024-09-09 DIAGNOSIS — K21.9 GASTRO-ESOPHAGEAL REFLUX DISEASE WITHOUT ESOPHAGITIS: Primary | ICD-10-CM

## 2024-09-09 DIAGNOSIS — K22.70 BARRETT'S ESOPHAGUS WITHOUT DYSPLASIA: ICD-10-CM

## 2024-09-09 PROCEDURE — 99213 OFFICE O/P EST LOW 20 MIN: CPT | Performed by: INTERNAL MEDICINE

## 2024-09-09 PROCEDURE — 3008F BODY MASS INDEX DOCD: CPT | Performed by: INTERNAL MEDICINE

## 2024-09-09 PROCEDURE — 1036F TOBACCO NON-USER: CPT | Performed by: INTERNAL MEDICINE

## 2024-09-09 RX ORDER — AZELASTINE 1 MG/ML
1 SPRAY, METERED NASAL 2 TIMES DAILY
COMMUNITY

## 2024-09-09 RX ORDER — FLUTICASONE FUROATE 27.5 UG/1
2 SPRAY, METERED NASAL
COMMUNITY

## 2024-09-09 NOTE — PATIENT INSTRUCTIONS
The symptoms have flared probably from weight gain but seem to be improving. Continue omeprazole 20-40 mg daily to control symptoms and help prevent progression of Barretts. You will be due for repeat endoscopy in 2026. Follow up in 1 year or sooner if needed.

## 2024-09-09 NOTE — PROGRESS NOTES
"History Of Present Illness  Tano Can \"Prabhakar\" is a 54 y.o. male presenting with GERD and Barretts esophagus.    He has a longstanding history of nondysplastic long segment Hitchcock's esophagus with his last EGD in December 2023 showing C5M5 disease as well as a 5 cm hiatal hernia.  We also saw fundic gland polyps.  He has been treated chronically with omeprazole and at some point started taking the over-the-counter 20 mg dose instead of the 40 mg dose.  However symptoms are well-controlled until earlier this year when he had some weight gain and had worsening GERD symptoms.  On his own he increased the dose to 40 mg and began using Gaviscon as needed.  He subsequently lost weight and symptoms have improved.  He denies dysphagia, anorexia or early satiety.     Past Medical History  Past Medical History:   Diagnosis Date    GERD (gastroesophageal reflux disease)     Hypothyroid     Seasonal allergies        Surgical History  Past Surgical History:   Procedure Laterality Date    HERNIA REPAIR  02/25/2016    Hernia Repair    MOUTH SURGERY  02/25/2016    Oral Surgery Tooth Extraction    OTHER SURGICAL HISTORY  12/13/2021    Colonoscopy    OTHER SURGICAL HISTORY  12/13/2021    Esophagogastroduodenoscopy        Social History  He reports that he has never smoked. He has never used smokeless tobacco. He reports current alcohol use. He reports that he does not use drugs.    Family History  No family history on file.     Allergies  Hay fever and allergy relief    Last Recorded Vitals  /74 (BP Location: Left arm, Patient Position: Sitting, BP Cuff Size: Adult)   Pulse 64   Temp 37.2 °C (99 °F)   Ht 1.778 m (5' 10\")   Wt 77.1 kg (170 lb)   BMI 24.39 kg/m²        Physical Exam  Vitals reviewed.   Constitutional:       Appearance: Normal appearance.   Cardiovascular:      Rate and Rhythm: Normal rate and regular rhythm.   Pulmonary:      Effort: Pulmonary effort is normal.      Breath sounds: Normal breath " sounds.   Abdominal:      General: Bowel sounds are normal.      Palpations: Abdomen is soft. There is no mass.      Tenderness: There is no abdominal tenderness.   Neurological:      Mental Status: He is alert.           Labs  Lab Results   Component Value Date    GLUCOSE 88 11/02/2023    CALCIUM 9.3 11/02/2023     (L) 11/02/2023    K 3.9 11/02/2023    CO2 29 11/02/2023     11/02/2023    BUN 14 11/02/2023    CREATININE 0.87 11/02/2023     Lab Results   Component Value Date    WBC 6.2 11/02/2023    HGB 13.8 11/02/2023    HCT 40.2 (L) 11/02/2023    MCV 92 11/02/2023     11/02/2023     (L) 11/02/2023    K 3.9 11/02/2023     11/02/2023    CO2 29 11/02/2023    BUN 14 11/02/2023    CREATININE 0.87 11/02/2023    CALCIUM 9.3 11/02/2023    PROT 6.6 11/02/2023    BILITOT 0.4 11/02/2023    ALKPHOS 46 11/02/2023    ALT 20 11/02/2023    AST 25 11/02/2023    GLUCOSE 88 11/02/2023    CRP 3.75 (A) 12/18/2019           Imaging     EGD    Addendum Date: 12/27/2023    Impression C5M5 Hitchcock's esophagus; electronic chromoendoscopy (NBI) was used; performed four-quadrant cold forceps biopsies every 2 cm for dysplasia screening 5 cm hiatal hernia 3 subcentimeter fundic gland polyps in the stomach The duodenal bulb, 1st part of the duodenum and 2nd part of the duodenum appeared normal. Findings C5M5 Hitchcock's esophagus observed with no associated lesion without using a distal attachment cap. The diaphragmatic impression was 42 cm from the incisors, Z-line was 32 cm from the incisors and the top of gastric folds was 37 cm from the incisors. The total length of Hitchcock's esophagus was 5 cm; electronic chromoendoscopy (NBI) was used; performed 3 four-quadrant cold forceps biopsies every 2 cm for dysplasia screening 5 cm hiatal hernia Three sessile fundic gland polyps measuring smaller than 5 mm in the stomach The duodenal bulb, 1st part of the duodenum and 2nd part of the duodenum appeared normal.  Recommendation  Await pathology results  Follow up with me in clinic  Schedule repeat EGD  Hitchcock's esophagus surveillance  Indication Hitchcock's esophagus without dysplasia Staff Staff Role Luca Leon MD Proceduralist Medications meperidine (PF) (Demerol) injection 75 mg midazolam (Versed) injection 3 mg (Totals for administrations occurring from 0721 to 0818 on 12/27/23) Preprocedure A history and physical has been performed, and patient medication allergies have been reviewed. The patient's tolerance of previous anesthesia has been reviewed. The risks and benefits of the procedure and the sedation options and risks were discussed with the patient. All questions were answered and informed consent obtained. Details of the Procedure The patient underwent moderate sedation, which was administered by the procedural nurse. The patient's blood pressure, ECG, ETCO2, heart rate, level of consciousness, oxygen and respirations were monitored throughout the procedure. The scope was introduced through the mouth and advanced to the second part of the duodenum. Retroflexion was performed in the cardia. Prior to the procedure, the patient's H. Pylori status was unknown. The patient experienced no blood loss. The procedure was not difficult. The patient tolerated the procedure well. There were no apparent adverse events. Events Procedure Events Event Event Time ENDO SCOPE IN TIME 12/27/2023  8:03 AM Specimens ID Type Source Tests Collected by Time 1 : esophagus at 37 cm Tissue ESOPHAGUS DISTAL BIOPSY SURGICAL PATHOLOGY EXAM Luca Leon MD 12/27/2023 0811 2 : esophagus at 35 cm bx Tissue ESOPHAGUS DISTAL BIOPSY SURGICAL PATHOLOGY EXAM Luca Leon MD 12/27/2023 0813 3 : esophogus a=bx at 33 cm Tissue ESOPHAGUS DISTAL BIOPSY SURGICAL PATHOLOGY EXAM Luca Leon MD 12/27/2023 0815 Procedure Location [facility] [address] [place] [id number] [phone] [place]erring  [place] [id number] [address] [facility] [place] [address] Procedure Provider Luca Leon MD    Result Date: 12/27/2023  Table formatting from the original result was not included. Impression C5M5 Hitchcock's esophagus; electronic chromoendoscopy (NBI) was used; performed four-quadrant cold forceps biopsies every 2 cm for dysplasia screening 5 cm hiatal hernia 3 subcentimeter fundic gland polyps in the stomach The duodenal bulb, 1st part of the duodenum and 2nd part of the duodenum appeared normal. Findings C5M5 Hitchcock's esophagus observed with no associated lesion without using a distal attachment cap. The diaphragmatic impression was 42 cm from the incisors, Z-line was 32 cm from the incisors and the top of gastric folds was 37 cm from the incisors. The total length of Hitchcock's esophagus was 5 cm; electronic chromoendoscopy (NBI) was used; performed 3 four-quadrant cold forceps biopsies every 2 cm for dysplasia screening 5 cm hiatal hernia Three sessile fundic gland polyps measuring smaller than 5 mm in the stomach The duodenal bulb, 1st part of the duodenum and 2nd part of the duodenum appeared normal. Recommendation  Await pathology results  Follow up with me in clinic  Schedule repeat EGD  Hitchcock's esophagus surveillance  Indication Hitchcock's esophagus without dysplasia Staff Staff Role Luca Leon MD Proceduralist Medications meperidine (PF) (Demerol) injection 75 mg midazolam (Versed) injection 3 mg (Totals for administrations occurring from 0721 to 0818 on 12/27/23) Preprocedure A history and physical has been performed, and patient medication allergies have been reviewed. The patient's tolerance of previous anesthesia has been reviewed. The risks and benefits of the procedure and the sedation options and risks were discussed with the patient. All questions were answered and informed consent obtained. Details of the Procedure The patient underwent  moderate sedation, which was administered by the procedural nurse. The patient's blood pressure, ECG, ETCO2, heart rate, level of consciousness, oxygen and respirations were monitored throughout the procedure. The scope was introduced through the mouth and advanced to the second part of the duodenum. Retroflexion was performed in the cardia. Prior to the procedure, the patient's H. Pylori status was unknown. The patient experienced no blood loss. The procedure was not difficult. The patient tolerated the procedure well. There were no apparent adverse events. Events Procedure Events Event Event Time ENDO SCOPE IN TIME 12/27/2023  8:03 AM Specimens ID Type Source Tests Collected by Time 1 : esophagus at 37 cm Tissue ESOPHAGUS DISTAL BIOPSY SURGICAL PATHOLOGY EXAM Luca Leon MD 12/27/2023 0811 2 : esophagus at 35 cm bx Tissue ESOPHAGUS DISTAL BIOPSY SURGICAL PATHOLOGY EXAM Luca Leon MD 12/27/2023 0813 3 : esophogus a=bx at 33 cm Tissue ESOPHAGUS DISTAL BIOPSY SURGICAL PATHOLOGY EXAM Luca Leon MD 12/27/2023 0815 Procedure Location [address] [id number] [phone] [place] [id number] [address] [facility] [place] [address] Procedure Provider Luca Leon MD     EGD  Addendum: Impression   C5M5 Hitchcock's esophagus; electronic chromoendoscopy (NBI) was used;  performed four-quadrant cold forceps biopsies every 2 cm for dysplasia  screening   5 cm hiatal hernia   3 subcentimeter fundic gland polyps in the stomach   The duodenal bulb, 1st part of the duodenum and 2nd part of the duodenum  appeared normal.     Findings   C5M5 Hitchcock's esophagus observed with no associated lesion without using  a distal attachment cap. The diaphragmatic impression was 42 cm from the  incisors, Z-line was 32 cm from the incisors and the top of gastric folds  was 37 cm from the incisors. The total  length of Hitchcock's esophagus was 5  cm; electronic chromoendoscopy (NBI) was used; performed 3 four-quadrant  cold forceps biopsies every 2 cm for dysplasia screening   5 cm hiatal hernia   Three sessile fundic gland polyps measuring smaller than 5 mm in the  stomach   The duodenal bulb, 1st part of the duodenum and 2nd part of the duodenum  appeared normal.     Recommendation    Await pathology results     Follow up with me in clinic     Schedule repeat EGD     Hitchcock's esophagus surveillance         Indication   Hitchcock's esophagus without dysplasia     Staff   Staff Role    Luca Leon MD Proceduralist      Medications   meperidine (PF) (Demerol) injection 75 mg    midazolam (Versed) injection 3 mg    (Totals for administrations occurring from 0721 to 0818 on 12/27/23)      Preprocedure   A history and physical has been performed, and patient medication  allergies have been reviewed. The patient's tolerance of previous  anesthesia has been reviewed. The risks and benefits of the procedure and  the sedation options and risks were discussed with the patient. All  questions were answered and informed consent obtained.     Details of the Procedure   The patient underwent moderate sedation, which was administered by the  procedural nurse. The patient's blood pressure, ECG, ETCO2, heart rate,  level of consciousness, oxygen and respirations were monitored throughout  the procedure. The scope was introduced through the mouth and advanced to  the second part of the duodenum. Retroflexion was performed in the cardia.  Prior to the procedure, the patient's H. Pylori status was unknown. The  patient experienced no blood loss. The procedure was not difficult. The  patient tolerated the procedure well. There were no apparent adverse  events.      Events   Procedure Events    Event Event Time    ENDO SCOPE IN TIME 12/27/2023  8:03 AM      Specimens   ID Type Source Tests Collected by Time    1 : esophagus at 37 cm  Tissue ESOPHAGUS DISTAL BIOPSY SURGICAL PATHOLOGY  EXAM Luca Leon MD 12/27/2023 0811    2 : esophagus at 35 cm bx Tissue ESOPHAGUS DISTAL BIOPSY SURGICAL  PATHOLOGY EXAM uLca Leon MD 12/27/2023 0813    3 : esophogus a=bx at 33 cm Tissue ESOPHAGUS DISTAL BIOPSY SURGICAL  PATHOLOGY EXAM Luca Leon MD 12/27/2023 0815      Procedure Location   [facility]   [address] [facility] [id number] [phone]     Referring Provider   Luca Leon Md   [id number] [address]   Department [place] [address]     Procedure Provider   Luca Leon MD  Narrative: Table formatting from the original result was not included.  Impression  C5M5 Hitchcock's esophagus; electronic chromoendoscopy (NBI) was used;   performed four-quadrant cold forceps biopsies every 2 cm for dysplasia   screening  5 cm hiatal hernia  3 subcentimeter fundic gland polyps in the stomach  The duodenal bulb, 1st part of the duodenum and 2nd part of the duodenum   appeared normal.    Findings  C5M5 Hitchcock's esophagus observed with no associated lesion without using   a distal attachment cap. The diaphragmatic impression was 42 cm from the   incisors, Z-line was 32 cm from the incisors and the top of gastric folds   was 37 cm from the incisors. The total length of Hitchcock's esophagus was 5   cm; electronic chromoendoscopy (NBI) was used; performed 3 four-quadrant   cold forceps biopsies every 2 cm for dysplasia screening  5 cm hiatal hernia  Three sessile fundic gland polyps measuring smaller than 5 mm in the   stomach  The duodenal bulb, 1st part of the duodenum and 2nd part of the duodenum   appeared normal.    Recommendation   Await pathology results    Follow up with me in clinic    Schedule repeat EGD    Hitchcock's esophagus surveillance       Indication  Hitchcock's esophagus without dysplasia    Staff  Staff Role   Luca Leon MD Proceduralist      Medications  meperidine (PF) (Demerol) injection 75 mg   midazolam (Versed) injection 3 mg   (Totals for administrations occurring from 0721 to 0818 on 12/27/23)     Preprocedure  A history and physical has been performed, and patient medication   allergies have been reviewed. The patient's tolerance of previous   anesthesia has been reviewed. The risks and benefits of the procedure and   the sedation options and risks were discussed with the patient. All   questions were answered and informed consent obtained.    Details of the Procedure  The patient underwent moderate sedation, which was administered by the   procedural nurse. The patient's blood pressure, ECG, ETCO2, heart rate,   level of consciousness, oxygen and respirations were monitored throughout   the procedure. The scope was introduced through the mouth and advanced to   the second part of the duodenum. Retroflexion was performed in the cardia.   Prior to the procedure, the patient's H. Pylori status was unknown. The   patient experienced no blood loss. The procedure was not difficult. The   patient tolerated the procedure well. There were no apparent adverse   events.     Events  Procedure Events   Event Event Time   ENDO SCOPE IN TIME 12/27/2023  8:03 AM     Specimens  ID Type Source Tests Collected by Time   1 : esophagus at 37 cm Tissue ESOPHAGUS DISTAL BIOPSY SURGICAL PATHOLOGY   EXAM Luca Leon MD 12/27/2023 0811   2 : esophagus at 35 cm bx Tissue ESOPHAGUS DISTAL BIOPSY SURGICAL   PATHOLOGY EXAM Luca Leon MD 12/27/2023 0813   3 : esophogus a=bx at 33 cm Tissue ESOPHAGUS DISTAL BIOPSY SURGICAL   PATHOLOGY EXAM Luca Leon MD 12/27/2023 0815     Procedure Location  [address] [address] [phone]    Referring Provider  Luca Leon Md  [id number] [address]  Department [place] [address]    Procedure Provider  Luca SCOTT  MD Edward         ASSESSMENT & PLAN  Problem List Items Addressed This Visit             ICD-10-CM    Hitchcock's esophagus without dysplasia K22.70     Other Visit Diagnoses         Codes    Gastro-esophageal reflux disease without esophagitis    -  Primary K21.9          He has long segment Hitchcock's esophagus currently under good control with PPI therapy.  In retrospect weight loss seems to be the most beneficial intervention for him.  He will be due for routine EGD in 2026 and will follow-up in the office in 1 year or sooner if needed.    I spent 15 minutes in the professional and overall care of this patient.      Luca Leon MD

## 2024-09-18 DIAGNOSIS — E03.9 HYPOTHYROIDISM, UNSPECIFIED TYPE: ICD-10-CM

## 2024-09-18 RX ORDER — LEVOTHYROXINE SODIUM 50 UG/1
50 TABLET ORAL DAILY
Qty: 90 TABLET | Refills: 1 | Status: SHIPPED | OUTPATIENT
Start: 2024-09-18

## 2024-09-27 DIAGNOSIS — E03.9 HYPOTHYROIDISM, UNSPECIFIED TYPE: ICD-10-CM

## 2024-09-27 RX ORDER — LEVOTHYROXINE SODIUM 50 UG/1
50 TABLET ORAL DAILY
Qty: 90 TABLET | Refills: 1 | Status: SHIPPED | OUTPATIENT
Start: 2024-09-27

## 2024-09-27 NOTE — TELEPHONE ENCOUNTER
MEDICATION REFILL:    levothyroxine (Synthroid, Levoxyl) 50 mcg tablet [[id number]]    Order Details  Dose: 50 mcg Route: oral Frequency: Daily   Dispense Quantity: 90 tablet Refills: 1    Note to Pharmacy: This prescription was filled on 8/29/2024. Any refills authorized will be placed on file.         Sig: TAKE 1 TABLET BY MOUTH ONCE DAILY       [id number] - [place], [place] - [address] [id number] [facility] [address]  Phone: [phone]  Fax: [phone]

## 2024-09-30 DIAGNOSIS — E03.9 HYPOTHYROIDISM, UNSPECIFIED TYPE: ICD-10-CM

## 2024-09-30 RX ORDER — LEVOTHYROXINE SODIUM 50 UG/1
50 TABLET ORAL DAILY
Qty: 90 TABLET | Refills: 1 | Status: SHIPPED | OUTPATIENT
Start: 2024-09-30